# Patient Record
Sex: MALE | Race: BLACK OR AFRICAN AMERICAN | Employment: OTHER | URBAN - METROPOLITAN AREA
[De-identification: names, ages, dates, MRNs, and addresses within clinical notes are randomized per-mention and may not be internally consistent; named-entity substitution may affect disease eponyms.]

---

## 2022-08-14 ENCOUNTER — HOSPITAL ENCOUNTER (EMERGENCY)
Age: 42
Discharge: HOME | End: 2022-08-14
Attending: EMERGENCY MEDICINE

## 2022-08-14 VITALS
OXYGEN SATURATION: 97 % | WEIGHT: 309 LBS | RESPIRATION RATE: 18 BRPM | DIASTOLIC BLOOD PRESSURE: 87 MMHG | TEMPERATURE: 99.6 F | HEART RATE: 98 BPM | SYSTOLIC BLOOD PRESSURE: 142 MMHG | BODY MASS INDEX: 45.63 KG/M2

## 2022-08-14 DIAGNOSIS — Z20.822 PERSON UNDER INVESTIGATION FOR COVID-19: ICD-10-CM

## 2022-08-14 DIAGNOSIS — J06.9 UPPER RESPIRATORY TRACT INFECTION, UNSPECIFIED TYPE: Primary | ICD-10-CM

## 2022-08-14 LAB
S PYO AG THROAT QL: NEGATIVE
SARS-COV-2, COV2: NORMAL

## 2022-08-14 PROCEDURE — 87147 CULTURE TYPE IMMUNOLOGIC: CPT

## 2022-08-14 PROCEDURE — U0003 INFECTIOUS AGENT DETECTION BY NUCLEIC ACID (DNA OR RNA); SEVERE ACUTE RESPIRATORY SYNDROME CORONAVIRUS 2 (SARS-COV-2) (CORONAVIRUS DISEASE [COVID-19]), AMPLIFIED PROBE TECHNIQUE, MAKING USE OF HIGH THROUGHPUT TECHNOLOGIES AS DESCRIBED BY CMS-2020-01-R: HCPCS

## 2022-08-14 PROCEDURE — 87880 STREP A ASSAY W/OPTIC: CPT

## 2022-08-14 PROCEDURE — 74011250637 HC RX REV CODE- 250/637: Performed by: PHYSICIAN ASSISTANT

## 2022-08-14 PROCEDURE — 87070 CULTURE OTHR SPECIMN AEROBIC: CPT

## 2022-08-14 PROCEDURE — 99283 EMERGENCY DEPT VISIT LOW MDM: CPT

## 2022-08-14 RX ORDER — DEXAMETHASONE SODIUM PHOSPHATE 4 MG/ML
10 INJECTION, SOLUTION INTRA-ARTICULAR; INTRALESIONAL; INTRAMUSCULAR; INTRAVENOUS; SOFT TISSUE
Status: DISCONTINUED | OUTPATIENT
Start: 2022-08-14 | End: 2022-08-14

## 2022-08-14 RX ORDER — DEXAMETHASONE SODIUM PHOSPHATE 4 MG/ML
10 INJECTION, SOLUTION INTRA-ARTICULAR; INTRALESIONAL; INTRAMUSCULAR; INTRAVENOUS; SOFT TISSUE
Status: COMPLETED | OUTPATIENT
Start: 2022-08-14 | End: 2022-08-14

## 2022-08-14 RX ADMIN — DEXAMETHASONE SODIUM PHOSPHATE 10 MG: 4 INJECTION, SOLUTION INTRAMUSCULAR; INTRAVENOUS at 22:18

## 2022-08-14 NOTE — Clinical Note
[facility] [place] [place]  [facility]  [address]  [facility] [address] [phone]    Work/School Note    Date: 8/14/2022     To Whom It May concern:    Hira Lawson was evaluated by the following provider(s):  Attending Provider: Cole Patricia MD  Physician Assistant: Dano Trujillo, [address] [place] [address] virus is suspected. Per the CDC guidelines we recommend home isolation until the following conditions are all met:    1. At least five days have passed since symptoms first appeared and/or had a close exposure,   2. After home isolation for five days, wearing a mask around others for the next five days,  3. At least 24 have passed since last fever without the use of fever-reducing medications and  4.  Symptoms (eg cough, shortness of breath) have improved      Sincerely,          Lorena Garcia RN

## 2022-08-14 NOTE — Clinical Note
[facility] [place]  THE [facility] EMERGENCY DEPT  [address]  [facility] NEWS [facility] [address] [phone]    Work/School Note    Date: 8/14/2022     To Whom It May concern:    Hira Lawson was evaluated by the following provider(s):  Attending Provider: Quincy Chung MD  Physician Assistant: Delaney Marino, [address] [place] [address] virus is suspected. Per the CDC guidelines we recommend home isolation until the following conditions are all met:    1. At least five days have passed since symptoms first appeared and/or had a close exposure,   2. After home isolation for five days, wearing a mask around others for the next five days,  3. At least 24 have passed since last fever without the use of fever-reducing medications and  4.  Symptoms (eg cough, shortness of breath) have improved      Sincerely,          WALKER Liz

## 2022-08-15 ENCOUNTER — PATIENT OUTREACH (OUTPATIENT)
Dept: CASE MANAGEMENT | Age: 42
End: 2022-08-15

## 2022-08-15 LAB — SARS-COV-2, NAA: DETECTED

## 2022-08-15 NOTE — PROGRESS NOTES
Care Transitions Nurse ( CTN) attempted to contact patient via telephone call. For follow up. Call within 2 business days of discharge: Yes   There was no response and no option to leave a voicemail message at this time.

## 2022-08-15 NOTE — ED PROVIDER NOTES
EMERGENCY DEPARTMENT HISTORY AND PHYSICAL EXAM    Date: 8/14/2022  Patient Name: Grant Spencer    History of Presenting Illness     Chief Complaint   Patient presents with    Gland Swelling         History Provided By: Patient    Chief Complaint: sore throat       Additional History (Context):   9:22 PM  Ceil  is a 40 y.o. male presents to the emergency department C/O cough congestion sore throat has been going on for the past couple days. No difficulty breathing or swallowing. No fevers. No known exposure to anyone to has been sick or positive for COVID recently. PCP: None        Past History     Past Medical History:  No past medical history on file. Past Surgical History:  No past surgical history on file. Family History:  No family history on file. Social History: Allergies:  No Known Allergies    Review of Systems   Review of Systems   Constitutional:  Negative for chills and fever. HENT:  Positive for congestion and sore throat. Negative for drooling, sinus pain, trouble swallowing and voice change. Respiratory:  Positive for cough. Negative for shortness of breath. Cardiovascular:  Negative for chest pain. Gastrointestinal:  Negative for abdominal pain, diarrhea, nausea and vomiting. Neurological:  Negative for dizziness, weakness, light-headedness, numbness and headaches. All other systems reviewed and are negative. Physical Exam     Vitals:    08/14/22 2059 08/14/22 2203   BP: (!) 142/87    Pulse: (!) 106 98   Resp: 20 18   Temp: 99.6 °F (37.6 °C)    SpO2:  97%   Weight: 140.2 kg (309 lb)      Physical Exam  Vitals and nursing note reviewed. Constitutional:       General: He is not in acute distress. Appearance: He is well-developed. Comments: Alert, well appearing, non toxic, speaking in full sentences without difficulty    HENT:      Head: Normocephalic and atraumatic.       Right Ear: Tympanic membrane, ear canal and external ear normal. Tympanic membrane is not perforated, erythematous, retracted or bulging. Left Ear: Tympanic membrane, ear canal and external ear normal. Tympanic membrane is not perforated, erythematous, retracted or bulging. Nose: Nose normal. No mucosal edema or rhinorrhea. Right Sinus: No maxillary sinus tenderness or frontal sinus tenderness. Left Sinus: No maxillary sinus tenderness or frontal sinus tenderness. Mouth/Throat:      Mouth: Mucous membranes are not dry. Pharynx: Uvula midline. No oropharyngeal exudate, posterior oropharyngeal erythema or uvula swelling. Tonsils: No tonsillar abscesses. Comments: Airway is patent no exudates or tonsillar swelling uvula midline swallowing secretions without difficulty no stridor  Cardiovascular:      Rate and Rhythm: Normal rate and regular rhythm. Heart sounds: Normal heart sounds. No murmur heard. Pulmonary:      Effort: Pulmonary effort is normal. No respiratory distress. Breath sounds: Normal breath sounds. No wheezing or rales. Musculoskeletal:      Cervical back: Normal range of motion and neck supple. Lymphadenopathy:      Cervical: No cervical adenopathy. Skin:     General: Skin is warm and dry. Findings: No rash. Neurological:      Mental Status: He is alert and oriented to person, place, and time. Psychiatric:         Judgment: Judgment normal.         Diagnostic Study Results     Labs:     Recent Results (from the past 12 hour(s))   SARS-COV-2    Collection Time: 08/14/22 10:03 PM   Result Value Ref Range    SARS-CoV-2 by PCR Please find results under separate order     POC GROUP A STREP    Collection Time: 08/14/22 10:15 PM   Result Value Ref Range    Group A strep (POC) Negative NEG         Radiologic Studies:   No orders to display     CT Results  (Last 48 hours)      None          CXR Results  (Last 48 hours)      None            Medical Decision Making   I am the first provider for this patient.     I reviewed the vital signs, available nursing notes, past medical history, past surgical history, family history and social history. Vital Signs: Reviewed the patient's vital signs. Pulse Oximetry Analysis: 97% on RA       Records Reviewed: Nursing Notes and Old Medical Records    Procedures:  Procedures    ED Course:   9:22 PM Initial assessment performed. The patients presenting problems have been discussed, and they are in agreement with the care plan formulated and outlined with them. I have encouraged them to ask questions as they arise throughout their visit. Discussion:  Pt presents with sore throat congestion and cough this been going on for the past couple days afebrile no other medical problems. Rapid strep negative. COVID swab pending. Will have patient self isolate until test results. To auscultation bilaterally O2 97% on room air. Strict return precautions given, pt offering no questions or complaints. Diagnosis and Disposition     DISCHARGE NOTE:  Issac Lawson's  results have been reviewed with him. He has been counseled regarding his diagnosis, treatment, and plan. He verbally conveys understanding and agreement of the signs, symptoms, diagnosis, treatment and prognosis and additionally agrees to follow up as discussed. He also agrees with the care-plan and conveys that all of his questions have been answered. I have also provided discharge instructions for him that include: educational information regarding their diagnosis and treatment, and list of reasons why they would want to return to the ED prior to their follow-up appointment, should his condition change. He has been provided with education for proper emergency department utilization. CLINICAL IMPRESSION:    1. Upper respiratory tract infection, unspecified type    2. Person under investigation for COVID-19        PLAN:  1. D/C Home  2. There are no discharge medications for this patient.     3.   Follow-up Information       Follow up With Specialties Details Why Contact Info    [facility]    [id number] [address] [facility], [address] [facility] [address]    THE [facility] EMERGENCY DEPT Emergency Medicine   [address] [facility] [place] [address] [phone]                   Please note that this dictation was completed with [employer], the computer voice recognition software. Quite often unanticipated grammatical, syntax, homophones, and other interpretive errors are inadvertently transcribed by the computer software. Please disregard these errors. Please excuse any errors that have escaped final proofreading.

## 2022-08-16 ENCOUNTER — PATIENT OUTREACH (OUTPATIENT)
Dept: CASE MANAGEMENT | Age: 42
End: 2022-08-16

## 2022-08-16 RX ORDER — AMOXICILLIN 500 MG/1
500 TABLET, FILM COATED ORAL 2 TIMES DAILY
Qty: 14 TABLET | Refills: 0 | Status: SHIPPED | OUTPATIENT
Start: 2022-08-16 | End: 2022-08-23

## 2022-08-16 NOTE — PROGRESS NOTES
Transitions of Care     Per chart review,  an ED staff member , has reached out via phone call to patient for follow up on this date. Care Transitions Nurse ( CTN)  will attempt to follow up with patient on another day.

## 2022-08-17 ENCOUNTER — PATIENT OUTREACH (OUTPATIENT)
Dept: CASE MANAGEMENT | Age: 42
End: 2022-08-17

## 2022-08-17 LAB
BACTERIA SPEC CULT: ABNORMAL
BACTERIA SPEC CULT: ABNORMAL
SERVICE CMNT-IMP: ABNORMAL

## 2022-08-17 NOTE — PROGRESS NOTES
Patient contacted regarding COVID-19 diagnosis. Discussed COVID-19 related testing which was available at this time. Test results were positive. Patient informed of results, if available? Patient advises he was called and that the Covid-19 test results were positive. Care Transition Nurse contacted the patient by telephone to perform post discharge assessment. Call within 2 business days of discharge: Yes Verified name and  with patient as identifiers. Provided introduction to self, and explanation of the CTN/ACM role, and reason for call. Symptoms reviewed with patient who verbalized the following symptoms: no worsening symptoms      Due to no new or worsening symptoms encounter was not routed to provider for escalation. Discussed follow-up appointments. If no appointment was previously scheduled, appointment scheduling offered:  no. [facility] follow up appointment(s): No future appointments. Non-[facility] follow up appointment(s):   Patient advises that he does not have a current PCP and currently does not have insurance coverage. Discharge instructions were reviewed with patient regarding follow up with staff at the [address] [place] [address]. Return to ED as needed. Interventions to address risk factors:   Patient referred to the following resources as needed:   -  Lorri Toscano   - [facility],  patient alerted there is usually a fee for this service. - Health department state or local   - cdc.gov   -  ED discharge information      Advance Care Planning:   Does patient have an Advance Directive:  Not noted to be on file at this time  . CTN reviewed discharge instructions, medical action plan and red flag symptoms with the patient who verbalized understanding. Discussed exposure protocols and quarantine with CDC Guidelines.  Patient was given an opportunity to verbalize any questions and concerns and agrees to contact CTN or health care provider for questions related to their healthcare. Will follow up with patient regarding any new and changed medications related to discharge diagnosis. No medications noted as prescribed  at time of ED discharge. Was patient discharged with a pulse oximeter? no, not noted at this time     CTN provided contact information. Plan for follow-up call in 5-7 days /as needed based on severity of symptoms and risk factors.

## 2022-08-23 ENCOUNTER — PATIENT OUTREACH (OUTPATIENT)
Dept: CASE MANAGEMENT | Age: 42
End: 2022-08-23

## 2022-08-23 NOTE — PROGRESS NOTES
Transitions of Care Follow UP Call     Care Transitions Nurse ( CTN) attempted to contact patient via telephone call for follow up. There was no response and no option to leave a voicemail message at this time.

## 2022-09-01 ENCOUNTER — PATIENT OUTREACH (OUTPATIENT)
Dept: CASE MANAGEMENT | Age: 42
End: 2022-09-01

## 2022-09-01 NOTE — PROGRESS NOTES
Patient resolved from [address] Transitions episode on 9-1-22    Care Transitions Nurse ( CTN) attempted to contact patient via telephone call. There was no response and no option to leave a voicemail message at this time. Patient/family has been provided the following resources and education related to COVID-19:                         Signs, symptoms and red flags related to COVID-19            CDC quarantine guidelines  Patient has been alerted to  to the following resources as needed:             - [employer] ( fee associated with this service)             - Health Department, state or local             - cdc.gov             - Emergency Department               No further outreach scheduled with this CTN/ACM/LPN/HC/ MA. Episode of Care resolved. Patient has this CTN/ACM/LPN/HC/MA contact information if future needs arise.

## 2022-09-09 ENCOUNTER — PATIENT OUTREACH (OUTPATIENT)
Dept: CASE MANAGEMENT | Age: 42
End: 2022-09-09